# Patient Record
Sex: FEMALE | Race: WHITE
[De-identification: names, ages, dates, MRNs, and addresses within clinical notes are randomized per-mention and may not be internally consistent; named-entity substitution may affect disease eponyms.]

---

## 2017-02-17 ENCOUNTER — HOSPITAL ENCOUNTER (EMERGENCY)
Dept: HOSPITAL 68 - ERH | Age: 27
End: 2017-02-17
Payer: COMMERCIAL

## 2017-02-17 VITALS — BODY MASS INDEX: 29.27 KG/M2 | WEIGHT: 155 LBS | HEIGHT: 61 IN

## 2017-02-17 VITALS — DIASTOLIC BLOOD PRESSURE: 80 MMHG | SYSTOLIC BLOOD PRESSURE: 117 MMHG

## 2017-02-17 DIAGNOSIS — R10.12: Primary | ICD-10-CM

## 2017-02-17 LAB
ABSOLUTE GRANULOCYTE CT: 9.2 /CUMM (ref 1.4–6.5)
BASOPHILS # BLD: 0 /CUMM (ref 0–0.2)
BASOPHILS NFR BLD: 0 % (ref 0–2)
EOSINOPHIL # BLD: 0 /CUMM (ref 0–0.7)
EOSINOPHIL NFR BLD: 0 % (ref 0–5)
ERYTHROCYTE [DISTWIDTH] IN BLOOD BY AUTOMATED COUNT: 15 % (ref 11.5–14.5)
GRANULOCYTES NFR BLD: 92.4 % (ref 42.2–75.2)
HCT VFR BLD CALC: 41.4 % (ref 37–47)
LYMPHOCYTES # BLD: 0.5 /CUMM (ref 1.2–3.4)
MCH RBC QN AUTO: 28.1 PG (ref 27–31)
MCHC RBC AUTO-ENTMCNC: 34 G/DL (ref 33–37)
MCV RBC AUTO: 82.6 FL (ref 81–99)
MONOCYTES # BLD: 0.3 /CUMM (ref 0.1–0.6)
PLATELET # BLD: 234 /CUMM (ref 130–400)
PMV BLD AUTO: 9.9 FL (ref 7.4–10.4)
RED BLOOD CELL CT: 5.02 /CUMM (ref 4.2–5.4)
WBC # BLD AUTO: 9.9 /CUMM (ref 4.8–10.8)

## 2017-02-17 PROCEDURE — G0480 DRUG TEST DEF 1-7 CLASSES: HCPCS

## 2017-02-17 NOTE — CT SCAN REPORT
EXAMINATION:
CT ABDOMEN AND PELVIS WITH CONTRAST
 
CLINICAL INFORMATION:
Nausea and vomiting. High CRP
 
COMPARISON:
None
 
TECHNIQUE:
Multidetector volumetric imaging was performed of the abdomen and pelvis
before and after the IV administration of 94 mL of Optiray 320 intravenous
contrast. Sagittal and coronal reformatted images were obtained on the
technologist's workstation.
 
DLP:
379.69 mGy-cm
 
FINDINGS:
 
LUNG BASES: The visualized lung bases are unremarkable.
 
LIVER, GALLBLADDER, AND BILIARY TREE: The liver is normal in size, shape, and
attenuation. No focal hepatic lesion or biliary ductal dilatation is present.
The gallbladder is unremarkable with no evidence of radiopaque gallstones,
gallbladder wall thickening, or obvious pericholecystic inflammatory changes.
 
 
PANCREAS: Unremarkable.
 
SPLEEN: Unremarkable.
 
ADRENAL GLANDS: Unremarkable.
 
KIDNEYS AND URETERS: The kidneys are normal in size, shape, and attenuation.
No hydronephrosis, hydroureter, or calculi seen. No perinephric stranding.
 
BLADDER: Unremarkable.
 
GASTROINTESTINAL TRACT: The small and large bowel are unremarkable. The
appendix is unremarkable.
 
ABDOMINAL WALL: No significant hernia is appreciated.
 
LYMPH NODES: Normal.
 
VASCULAR: Unremarkable.
 
PELVIC VISCERA: Unremarkable.
 
OSSEOUS STRUCTURES: Unremarkable.
 
IMPRESSION:
No significant abnormality.

## 2017-02-17 NOTE — ED GI/GU/ABDOMINAL COMPLAINT
History of Present Illness
 
General
Chief Complaint: General Adult
Stated Complaint: "IM NOT FEELING WELL, LIKE VOMITING"
Source: patient
Exam Limitations: no limitations
 
Vital Signs & Intake/Output
Vital Signs & Intake/Output
 Vital Signs
 
 
Date Time Temp Pulse Resp B/P Pulse O2 O2 Flow FiO2
 
     Ox Delivery Rate 
 
 1235 98.8 116 20 117/80 99 Room Air  
 
 
Allergies
Coded Allergies:
NO KNOWN ALLERGIES (06/03/15)
 
Reconcile Medications
Cephalexin (Keflex) 500 MG CAPSULE   1 TAB PO TID 
Dicyclomine Hydrochloride (Bentyl) 10 MG CAPSULE   1 CAP PO TID PRN  
Escitalopram Oxalate 20 MG TABLET   1 TAB PO DAILY MENTAL HEALTH  (Reported)
Ibuprofen 800 MG TABLET   800 MG PO Q6P PRN PAIN SCALE 4-6
IRON CARB,GL/FA/B12/C/DOCUSATE (Ferralet 90 Tablet) 90 MG-1 MG-12 MCG-120 MG-50 
MG TABLET   1 TAB PO DAILY SUPPLEMENT  (Reported)
Ondansetron (Zofran Odt) 4 MG TAB.RAPDIS   1 TAB SL TID PRN NAUSEA
OXYCODONE HCL/ACETAMINOPHEN (Percocet 5-325 MG Tablet) 325 MG/5 MG TAB   1 TAB 
PO Q4P PRN PAIN SCALE 7-8
Sulfamethoxazole/Trimethopri (Bactrim Ds 800 MG-160 MG) 1 TAB TAB   1 TAB PO BID
.
 
Triage Note:
PT C/O ABDOMINAL PAIN WITH N/V SINCE 430. PT
 DENIES URINARY S&S.
Triage Nurses Notes Reviewed? yes
Pregnant? N
Is pt currently breastfeeding? No
HPI:
This patient is a 26-year-old female who presented to the emergency department 
today for evaluation of nausea and vomiting.  The patient reported that she was 
woken from sleep at approximately 4:30 this morning with 8 out of 10 up her 
abdominal pain and nausea.  She reported that she has vomited approximately 5 
times today.  She reported that the pain is cramping.  The pain is nonradiating 
and constant.  She has not been able to eat or drink anything today.  No blood 
in the vomitus.  The patient denied any fevers, chills, or chest pain.  She 
reported she feels like it's difficult to breathe when the pain comes on.  She 
reported that the pain comes in waves.  Her last menstrual period was last week 
and normal.  She reported some mild lower back pain.  The patient denied any 
urinary burning, urgency, frequency, blood in the urine, or any diarrhea.  She 
reported that both of her small children have been sick recently.
(JUAN DIEGO PETIT PA-C)
 
Past History
 
Travel History
Traveled to [place] past 21 day No
 
Medical History
Any Pertinent Medical History? see below for history
Neurological: NONE
EENT: NONE
Cardiovascular: NONE
Respiratory: NONE
Gastrointestinal: NONE
Hepatic: NONE
Renal: NONE
Musculoskeletal: NONE
Psychiatric: NONE
Endocrine: NONE
Blood Disorders: anemia
Cancer(s): NONE
GYN/Reproductive: NONE
 
Surgical History
Surgical History: 
 
Psychosocial History
What is your primary language English
Tobacco Use: Never used
ETOH Use: occasional use
Illicit Drug Use: denies illicit drug use
 
Family History
Hx Contributory? No
(JUAN DIEGO PETIT PA-C)
 
Review of Systems
 
Review of Systems
Constitutional:
Reports: no symptoms. 
EENTM:
Reports: no symptoms. 
Respiratory:
Reports: see HPI. 
Cardiovascular:
Reports: no symptoms. 
GI:
Reports: see HPI. 
Genitourinary:
Reports: no symptoms. 
Musculoskeletal:
Reports: see HPI. 
Skin:
Reports: no symptoms. 
Neurological/Psychological:
Reports: no symptoms. 
All Other Systems: Reviewed and Negative
(JUAN DIEGO PETIT PA-C)
 
Physical Exam
 
Physical Exam
Gastrointestinal: normal bowel sounds, soft, no organomegaly, NONDISTENDED.  
tENDERNESS TO PALPATION IN THE LEFT UPPER QUADRANT WITH NO REBOUND OR GUARDING. 
nEGATIVE Kam SIGN.  nO mCbURNEY'S POINT TENDERNESS.  nO MASSES APPRECIATED
Comments:
Well-developed well-nourished person in no acute distress
HEENT: Normal EENT exam, moist mucous membranes
Pupils equally round and reactive to light.
Neck: Supple, no lymphadenopathy
Back: Normal inspection.  Normal gait.  Left-sided CVA tenderness.  No midline 
tenderness
Cardiovascular: Regular rate and rhythm with no murmurs
Respiratory: No respiratory distress. Breath sounds clear to auscultation 
bilaterally with no wheezes, rales, rhonchi
Extremity: Normal and equal pulses
Neuro: Alert oriented x3, cranial nerves II through XII grossly intact.
Skin: No appreciable rash on exposed skin, skin is warm and dry.
Psych: Mood and affect is normal
 
Core Measures
ACS in differential dx? No
Severe Sepsis Present: No
Septic Shock Present: No
(JUAN DIEGO PETIT PA-C)
 
Progress
Differential Diagnosis: AMI, appendicitis, biliary colic, bowel obstruction, 
colon cancer, cholecystitis, diverticulitis, ectopic pregnancy, endometritis, 
gastritis, hepatitis, ischemic bowel, inflamm bowel dis, intrauterine pregnancy,
kidney stone, ovarian cyst, ovarian torsion, pancreatitis, PID/cervicitis, PUD/
GERD, perforated viscous, threatened AB, UTI/pyelo
Plan of Care:
 Orders
 
 
Procedure Date/time Status
 
Add-on Test (ER Only)  1447 Active
 
HUMAN BETA HCG SCREEN  1306 Complete
 
C-REACTIVE PROTEIN  1306 Complete
 
RAPID VIRAL INFLUENZA A  1259 Complete
 
URINE PREGNANCY  1259 Complete
 
URINALYSIS  1259 Complete
 
LIPASE  1259 Complete
 
HIGH SENSITIVITY CRP  1259 Complete
 
ETHANOL  1259 Complete
 
DIRECT BILIRUBIN  1259 Complete
 
COMPREHENSIVE METABOLIC PANEL  1259 Complete
 
CBC WITHOUT DIFFERENTIAL  1259 Complete
 
AMYLASE  1259 Complete
 
 
 Laboratory Tests
 
 
17 1516:
Urine Color YEL, Urine Clarity CLEAR, Urine pH 7.0, Ur Specific Gravity 1.015, 
Urine Protein NEG, Urine Ketones 15  H, Urine Nitrite NEG, Urine Bilirubin NEG, 
Urine Urobilinogen 0.2, Ur Leukocyte Esterase NEG, Ur Microscopic EXAM NOT 
REQUIRED, Urine Hemoglobin NEG, Urine Glucose NEG, Urine Pregnancy Test NEGATIVE
 
17 1306:
Anion Gap 12, Estimated GFR > 60, BUN/Creatinine Ratio 20.0, Glucose 106  H, 
Calcium 9.4, Total Bilirubin 0.5, Direct Bilirubin 0.3, AST 15, ALT 25, Alkaline
Phosphatase 78, C-Reactive Prot, Quant 2.2  H, C-React Prot High Sens > 15.0  H,
Total Protein 7.6, Albumin 4.3, Globulin 3.3, Albumin/Globulin Ratio 1.3, 
Amylase 34, Lipase 56, Total Beta HCG NEGATIVE, CBC w Diff NO MAN DIFF REQ, RBC 
5.02, MCV 82.6, MCH 28.1, RDW 15.0  H, MPV 9.9, Gran % 92.4  H, Lymphocytes % 
4.7  L, Monocytes % 2.9, Eosinophils % 0, Basophils % 0  L, Absolute 
Granulocytes 9.2  H, Absolute Lymphocytes 0.5  L, Absolute Monocytes 0.3, 
Absolute Eosinophils 0, Absolute Basophils 0, PUBS MCHC 34.0, Serum Alcohol < 
10.0
 Microbiology
 1259  NASOPHARYN: Influenza Virus A & B Rapid Smear - COMP
 
Diagnostic Imaging:
Viewed by Me: CT Scan.  Discussed w/RAD: CT Scan. 
Radiology Impression: PATIENT: BERNARDINO TIJERINA  MEDICAL RECORD NO: [id number] 
PRESENT AGE: 26  PATIENT ACCOUNT NO: [id number] : 89  LOCATION: [facility] 
ORDERING PHYSICIAN: JUAN DIEGO PETIT PA-C     SERVICE DATE:  EXAM 
TYPE: CAT - CT ABD & PELVIS W IV CONTRAST EXAMINATION: CT ABDOMEN AND PELVIS 
WITH CONTRAST CLINICAL INFORMATION: Nausea and vomiting. High CRP COMPARISON: 
None TECHNIQUE: Multidetector volumetric imaging was performed of the abdomen 
and pelvis before and after the IV administration of 94 mL of Optiray 320 
intravenous contrast. Sagittal and coronal reformatted images were obtained on 
the technologist's workstation. DLP: 379.69 mGy-cm FINDINGS: LUNG BASES: The 
visualized lung bases are unremarkable. LIVER, GALLBLADDER, AND BILIARY TREE: 
The liver is normal in size, shape, and attenuation. No focal hepatic lesion or 
biliary ductal dilatation is present. The gallbladder is unremarkable with no 
evidence of radiopaque gallstones, gallbladder wall thickening, or obvious 
pericholecystic inflammatory changes. PANCREAS: Unremarkable. SPLEEN: 
Unremarkable. ADRENAL GLANDS: Unremarkable. KIDNEYS AND URETERS: The kidneys are
normal in size, shape, and attenuation. No hydronephrosis, hydroureter, or 
calculi seen. No perinephric stranding. BLADDER: Unremarkable. GASTROINTESTINAL 
TRACT: The small and large bowel are unremarkable. The appendix is unremarkable.
ABDOMINAL WALL: No significant hernia is appreciated. LYMPH NODES: Normal. 
VASCULAR: Unremarkable. PELVIC VISCERA: Unremarkable. OSSEOUS STRUCTURES: 
Unremarkable. IMPRESSION: No significant abnormality. DICTATED BY: LOLITA PARSONS MD  DATE/TIME DICTATED:17 :RAD.[facility]  DATE/TIME 
TRANSCRIBED:17 CONFIDENTIAL, DO NOT COPY WITHOUT APPROPRIATE 
AUTHORIZATION.  <Electronically signed in Other Vendor System>                  
                                                                     SIGNED BY: 
LOLITA PARSONS MD 17 [id number]
Initial ED EKG: none
(MARISABEL MELENDREZ,JUAN DIEGO)
 
Departure
 
Departure
Disposition: HOME OR SELF CARE
Condition: Stable
Clinical Impression
Primary Impression: Abdominal pain
Qualifiers:  Abdominal location: generalized Qualified Code: R10.84 - 
Generalized abdominal pain
Referrals:
LES MORRIS DO (PCP/Family)
 
Additional Instructions:
Takes Zofran as prescribed for nausea.  Take Bentyl as prescribed for abdominal 
spasms.  Please rest and be sure to stay hydrated.  Follow-up with your primary 
care physician.  Return for any worsening symptoms or concerns.
Departure Forms:
Customer Survey
General Discharge Information
Prescriptions:
Current Visit Scripts
Ondansetron (Zofran Odt) 1 TAB SL TID PRN NAUSEA
     #10 TAB 
 
Dicyclomine Hydrochloride (Bentyl) 1 CAP PO TID PRN  
     #12 CAP 
 
 
(JUAN DIEGO PETIT PA-C)
 
PA/NP Co-Sign Statement
Statement:
ED Attending supervision documentation-
 
[] I saw and evaluated the patient. I have also reviewed all the pertinent lab 
results and diagnostic results. I agree with the findings and the plan of care 
as documented in the PA's/NP's documentation. 
 
[X] I have reviewed the ED Record and agree with the PA's/NP's documentation.
 
[] Additions or exceptions (if any) to the PAs/NP's note and plan are 
summarized below:
[]
 
(JADA TOWNSEND,SHOSHANA)

## 2018-03-02 ENCOUNTER — HOSPITAL ENCOUNTER (EMERGENCY)
Dept: HOSPITAL 68 - ERH | Age: 28
End: 2018-03-02
Payer: COMMERCIAL

## 2018-03-02 VITALS — HEIGHT: 61 IN | WEIGHT: 169 LBS | BODY MASS INDEX: 31.91 KG/M2

## 2018-03-02 VITALS — SYSTOLIC BLOOD PRESSURE: 136 MMHG | DIASTOLIC BLOOD PRESSURE: 78 MMHG

## 2018-03-02 DIAGNOSIS — M25.562: Primary | ICD-10-CM

## 2018-03-02 DIAGNOSIS — R51: ICD-10-CM
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
